# Patient Record
Sex: MALE | Race: WHITE | NOT HISPANIC OR LATINO | Employment: FULL TIME | URBAN - METROPOLITAN AREA
[De-identification: names, ages, dates, MRNs, and addresses within clinical notes are randomized per-mention and may not be internally consistent; named-entity substitution may affect disease eponyms.]

---

## 2019-01-10 ENCOUNTER — OFFICE VISIT - HEALTHEAST (OUTPATIENT)
Dept: FAMILY MEDICINE | Facility: CLINIC | Age: 34
End: 2019-01-10

## 2019-01-10 DIAGNOSIS — R41.840 CONCENTRATION DEFICIT: ICD-10-CM

## 2019-01-10 DIAGNOSIS — F41.1 GAD (GENERALIZED ANXIETY DISORDER): ICD-10-CM

## 2019-01-21 ENCOUNTER — RECORDS - HEALTHEAST (OUTPATIENT)
Dept: ADMINISTRATIVE | Facility: OTHER | Age: 34
End: 2019-01-21

## 2019-03-08 ENCOUNTER — COMMUNICATION - HEALTHEAST (OUTPATIENT)
Dept: ADMINISTRATIVE | Facility: CLINIC | Age: 34
End: 2019-03-08

## 2019-12-23 ENCOUNTER — COMMUNICATION - HEALTHEAST (OUTPATIENT)
Dept: FAMILY MEDICINE | Facility: CLINIC | Age: 34
End: 2019-12-23

## 2019-12-24 ENCOUNTER — OFFICE VISIT - HEALTHEAST (OUTPATIENT)
Dept: FAMILY MEDICINE | Facility: CLINIC | Age: 34
End: 2019-12-24

## 2019-12-24 DIAGNOSIS — S82.56XA: ICD-10-CM

## 2019-12-24 DIAGNOSIS — F41.1 GAD (GENERALIZED ANXIETY DISORDER): ICD-10-CM

## 2019-12-24 DIAGNOSIS — Z01.818 PREOP EXAMINATION: ICD-10-CM

## 2019-12-24 LAB
ANION GAP SERPL CALCULATED.3IONS-SCNC: 11 MMOL/L (ref 5–18)
BUN SERPL-MCNC: 16 MG/DL (ref 8–22)
CALCIUM SERPL-MCNC: 9.6 MG/DL (ref 8.5–10.5)
CHLORIDE BLD-SCNC: 105 MMOL/L (ref 98–107)
CO2 SERPL-SCNC: 27 MMOL/L (ref 22–31)
CREAT SERPL-MCNC: 1.13 MG/DL (ref 0.7–1.3)
GFR SERPL CREATININE-BSD FRML MDRD: >60 ML/MIN/1.73M2
GLUCOSE BLD-MCNC: 136 MG/DL (ref 70–125)
HGB BLD-MCNC: 13.7 G/DL (ref 14–18)
POTASSIUM BLD-SCNC: 4.1 MMOL/L (ref 3.5–5)
SODIUM SERPL-SCNC: 143 MMOL/L (ref 136–145)

## 2019-12-24 ASSESSMENT — ANXIETY QUESTIONNAIRES
7. FEELING AFRAID AS IF SOMETHING AWFUL MIGHT HAPPEN: NOT AT ALL
5. BEING SO RESTLESS THAT IT IS HARD TO SIT STILL: MORE THAN HALF THE DAYS
1. FEELING NERVOUS, ANXIOUS, OR ON EDGE: MORE THAN HALF THE DAYS
6. BECOMING EASILY ANNOYED OR IRRITABLE: MORE THAN HALF THE DAYS
4. TROUBLE RELAXING: MORE THAN HALF THE DAYS
2. NOT BEING ABLE TO STOP OR CONTROL WORRYING: SEVERAL DAYS
3. WORRYING TOO MUCH ABOUT DIFFERENT THINGS: MORE THAN HALF THE DAYS
GAD7 TOTAL SCORE: 11

## 2019-12-24 ASSESSMENT — PATIENT HEALTH QUESTIONNAIRE - PHQ9: SUM OF ALL RESPONSES TO PHQ QUESTIONS 1-9: 7

## 2019-12-24 ASSESSMENT — MIFFLIN-ST. JEOR: SCORE: 1752.7

## 2019-12-26 ENCOUNTER — RECORDS - HEALTHEAST (OUTPATIENT)
Dept: ADMINISTRATIVE | Facility: OTHER | Age: 34
End: 2019-12-26

## 2020-01-30 ENCOUNTER — COMMUNICATION - HEALTHEAST (OUTPATIENT)
Dept: FAMILY MEDICINE | Facility: CLINIC | Age: 35
End: 2020-01-30

## 2020-01-30 DIAGNOSIS — F41.1 GAD (GENERALIZED ANXIETY DISORDER): ICD-10-CM

## 2020-03-10 ENCOUNTER — OFFICE VISIT - HEALTHEAST (OUTPATIENT)
Dept: FAMILY MEDICINE | Facility: CLINIC | Age: 35
End: 2020-03-10

## 2020-03-10 ENCOUNTER — RECORDS - HEALTHEAST (OUTPATIENT)
Dept: GENERAL RADIOLOGY | Facility: CLINIC | Age: 35
End: 2020-03-10

## 2020-03-10 DIAGNOSIS — S79.911A UNSPECIFIED INJURY OF RIGHT HIP, INITIAL ENCOUNTER: ICD-10-CM

## 2020-03-10 DIAGNOSIS — S79.911A INJURY OF RIGHT HIP, INITIAL ENCOUNTER: ICD-10-CM

## 2020-03-10 DIAGNOSIS — R03.0 ELEVATED BLOOD PRESSURE READING WITHOUT DIAGNOSIS OF HYPERTENSION: ICD-10-CM

## 2020-03-10 DIAGNOSIS — T14.8XXA HEMATOMA OF SKIN: ICD-10-CM
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
## 2020-03-18 ENCOUNTER — COMMUNICATION - HEALTHEAST (OUTPATIENT)
Dept: FAMILY MEDICINE | Facility: CLINIC | Age: 35
End: 2020-03-18

## 2020-03-19 ENCOUNTER — OFFICE VISIT - HEALTHEAST (OUTPATIENT)
Dept: FAMILY MEDICINE | Facility: CLINIC | Age: 35
End: 2020-03-19

## 2020-03-19 DIAGNOSIS — R03.0 ELEVATED BLOOD PRESSURE READING WITHOUT DIAGNOSIS OF HYPERTENSION: ICD-10-CM

## 2020-03-19 DIAGNOSIS — T14.8XXA HEMATOMA OF SKIN: ICD-10-CM

## 2020-04-20 ENCOUNTER — OFFICE VISIT - HEALTHEAST (OUTPATIENT)
Dept: FAMILY MEDICINE | Facility: CLINIC | Age: 35
End: 2020-04-20

## 2020-04-20 DIAGNOSIS — T14.8XXA HEMATOMA OF SKIN: ICD-10-CM

## 2020-04-20 DIAGNOSIS — R03.0 ELEVATED BLOOD PRESSURE READING WITHOUT DIAGNOSIS OF HYPERTENSION: ICD-10-CM

## 2020-12-01 ENCOUNTER — OFFICE VISIT - HEALTHEAST (OUTPATIENT)
Dept: FAMILY MEDICINE | Facility: CLINIC | Age: 35
End: 2020-12-01

## 2020-12-01 DIAGNOSIS — M79.671 RIGHT FOOT PAIN: ICD-10-CM

## 2020-12-01 DIAGNOSIS — Z01.818 PREOP EXAMINATION: ICD-10-CM

## 2020-12-01 LAB
ANION GAP SERPL CALCULATED.3IONS-SCNC: 8 MMOL/L (ref 5–18)
BUN SERPL-MCNC: 19 MG/DL (ref 8–22)
CALCIUM SERPL-MCNC: 9.4 MG/DL (ref 8.5–10.5)
CHLORIDE BLD-SCNC: 105 MMOL/L (ref 98–107)
CO2 SERPL-SCNC: 29 MMOL/L (ref 22–31)
CREAT SERPL-MCNC: 1.06 MG/DL (ref 0.7–1.3)
GFR SERPL CREATININE-BSD FRML MDRD: >60 ML/MIN/1.73M2
GLUCOSE BLD-MCNC: 127 MG/DL (ref 70–125)
HGB BLD-MCNC: 14 G/DL (ref 14–18)
POTASSIUM BLD-SCNC: 3.8 MMOL/L (ref 3.5–5)
SODIUM SERPL-SCNC: 142 MMOL/L (ref 136–145)

## 2020-12-01 ASSESSMENT — MIFFLIN-ST. JEOR: SCORE: 1825.85

## 2020-12-07 ENCOUNTER — RECORDS - HEALTHEAST (OUTPATIENT)
Dept: ADMINISTRATIVE | Facility: OTHER | Age: 35
End: 2020-12-07

## 2021-01-17 ENCOUNTER — COMMUNICATION - HEALTHEAST (OUTPATIENT)
Dept: FAMILY MEDICINE | Facility: CLINIC | Age: 36
End: 2021-01-17

## 2021-01-17 DIAGNOSIS — F41.1 GAD (GENERALIZED ANXIETY DISORDER): ICD-10-CM

## 2021-01-18 RX ORDER — ESCITALOPRAM OXALATE 10 MG/1
TABLET ORAL
Qty: 90 TABLET | Refills: 3 | Status: SHIPPED | OUTPATIENT
Start: 2021-01-18 | End: 2022-03-31

## 2021-05-05 ENCOUNTER — RECORDS - HEALTHEAST (OUTPATIENT)
Dept: ADMINISTRATIVE | Facility: OTHER | Age: 36
End: 2021-05-05

## 2021-05-24 ENCOUNTER — RECORDS - HEALTHEAST (OUTPATIENT)
Dept: ADMINISTRATIVE | Facility: CLINIC | Age: 36
End: 2021-05-24

## 2021-05-26 ASSESSMENT — PATIENT HEALTH QUESTIONNAIRE - PHQ9: SUM OF ALL RESPONSES TO PHQ QUESTIONS 1-9: 7

## 2021-05-28 ASSESSMENT — ANXIETY QUESTIONNAIRES: GAD7 TOTAL SCORE: 11

## 2021-05-29 ENCOUNTER — RECORDS - HEALTHEAST (OUTPATIENT)
Dept: ADMINISTRATIVE | Facility: CLINIC | Age: 36
End: 2021-05-29

## 2021-05-30 ENCOUNTER — RECORDS - HEALTHEAST (OUTPATIENT)
Dept: ADMINISTRATIVE | Facility: CLINIC | Age: 36
End: 2021-05-30

## 2021-05-31 ENCOUNTER — RECORDS - HEALTHEAST (OUTPATIENT)
Dept: ADMINISTRATIVE | Facility: CLINIC | Age: 36
End: 2021-05-31

## 2021-06-02 VITALS — WEIGHT: 182.19 LBS | BODY MASS INDEX: 26.14 KG/M2

## 2021-06-04 VITALS
BODY MASS INDEX: 26.08 KG/M2 | TEMPERATURE: 98.3 F | SYSTOLIC BLOOD PRESSURE: 116 MMHG | OXYGEN SATURATION: 97 % | DIASTOLIC BLOOD PRESSURE: 84 MMHG | HEART RATE: 69 BPM | HEIGHT: 70 IN | WEIGHT: 182.2 LBS

## 2021-06-04 VITALS
SYSTOLIC BLOOD PRESSURE: 150 MMHG | DIASTOLIC BLOOD PRESSURE: 110 MMHG | BODY MASS INDEX: 28.12 KG/M2 | HEART RATE: 68 BPM | WEIGHT: 196 LBS

## 2021-06-04 NOTE — PROGRESS NOTES
Preoperative Exam    Scheduled Procedure: Rt ankle surgery  12/26/19 - [facility]: Fax:[phone] - Dr. Kp Parker  Surgery Date:  12/26/19  Surgery Location: [facility]: Fax:[phone]     Surgeon:  Dr. Kp Parker    Assessment/Plan:     1. Preop examination    The patient is approved for the indicated surgery, with the proper anesthesia.  Patient should have no problems with this and I would expect a normal recovery.  Follow-up in their primary care clinic with any primary care needs after the surgery.  The necessary labs and tests were done today and reviewed by me.     - Hemoglobin  - Basic Metabolic Panel  - Td, Preservative Free (green label)    2. Nondisplaced fracture of medial malleolus of unspecified tibia, initial encounter for closed fracture      3. BYRON (generalized anxiety disorder)          Surgical Procedure Risk: Low (reported cardiac risk generally < 1%)  Have you had prior anesthesia?: Yes  Have you or any family members had a previous anesthesia reaction:  No  Do you or any family members have a history of a clotting or bleeding disorder?: No  Cardiac Risk Assessment: no increased risk for major cardiac complications    APPROVAL GIVEN to proceed with proposed procedure, without further diagnostic evaluation        Functional Status: Independent  Patient plans to recover at home with family.     Subjective:      Rogerio Calvillo is a 34 y.o. male who presents for a preoperative consultation.  He suffered a fractured medial malleolus at work a few weeks ago.  He was seen by orthopedics and was put in a boot and because the fracture is deemed to be intra-articular, he was recommended to have an open reduction internal fixation which he will have done on 1226.  He does not have a whole lot of pain when he is in the boot, but he does have some pain in the morning when he wakes up and then toward the end of the day as  well.    He is otherwise very healthy and has no other major concerns today.  He has had surgery and anesthesia before and he does well, with the exception that is get a bit nauseated and sometimes throws up after he comes out of anesthesia.    All other systems reviewed and are negative, other than those listed in the HPI.    Pertinent History  Do you have difficulty breathing or chest pain after walking up a flight of stairs: No  History of obstructive sleep apnea: No  Steroid use in the last 6 months: No  Frequent Aspirin/NSAID use: No  Prior Blood Transfusion: No  Prior Blood Transfusion Reaction: No  If for some reason prior to, during or after the procedure, if it is medically indicated, would you be willing to have a blood transfusion?:  There is no transfusion refusal.    Current Outpatient Medications   Medication Sig Dispense Refill     crutch Misc For home use. 99 weeks       escitalopram oxalate (LEXAPRO) 10 MG tablet Take 1 tablet (10 mg total) by mouth daily. 90 tablet 3     oxyCODONE (ROXICODONE) 5 MG immediate release tablet Take 5 mg by mouth.       No current facility-administered medications for this visit.         No Known Allergies    Patient Active Problem List   Diagnosis     BYRON (generalized anxiety disorder)       Past Medical History:   Diagnosis Date     Anxiety        Past Surgical History:   Procedure Laterality Date     MIDDLE EAR SURGERY         Social History     Socioeconomic History     Marital status: Single     Spouse name: Not on file     Number of children: Not on file     Years of education: Not on file     Highest education level: Not on file   Occupational History     Not on file   Social Needs     Financial resource strain: Not on file     Food insecurity:     Worry: Not on file     Inability: Not on file     Transportation needs:     Medical: Not on file     Non-medical: Not on file   Tobacco Use     Smoking status: Never Smoker     Smokeless tobacco: Never Used   Substance  "and Sexual Activity     Alcohol use: Yes     Frequency: 2-4 times a month     Comment: 1-2/month     Drug use: No     Sexual activity: Not Currently   Lifestyle     Physical activity:     Days per week: Not on file     Minutes per session: Not on file     Stress: Not on file   Relationships     Social connections:     Talks on phone: Not on file     Gets together: Not on file     Attends Yazdanism service: Not on file     Active member of club or organization: Not on file     Attends meetings of clubs or organizations: Not on file     Relationship status: Not on file     Intimate partner violence:     Fear of current or ex partner: Not on file     Emotionally abused: Not on file     Physically abused: Not on file     Forced sexual activity: Not on file   Other Topics Concern     Not on file   Social History Narrative     Not on file       Patient Care Team:  Jatinder Godoy MD as PCP - General (Family Medicine)  Jatinder Godoy MD as Assigned PCP          Objective:     Vitals:    12/24/19 0952   BP: (!) 134/100   Pulse: 69   Temp: 98.3  F (36.8  C)   SpO2: 97%   Weight: 182 lb 3.2 oz (82.6 kg)   Height: 5' 10\" (1.778 m)         Physical Exam:    General: Awake, Alert and Cooperative   Head: Normocephalic and Atraumatic   Eyes: PERRL, EOMI.   ENT: Normal pearly TMs bilaterally and Oropharynx clear   Neck: Supple and Thyroid without enlargement or nodules   Chest: Chest wall normal   Lungs: Clear to auscultation bilaterally   Heart:: Regular rate and rhythm and no murmurs.  No significant LE edema.   Abdomen: Soft, nontender, nondistended and no hepatosplenomegaly   Musculoskeletal: Moving all extremities and No pain in the extremities   Neuro: Alert and oriented times 3 and Grossly normal   Skin: No rashes or lesions noted        There are no Patient Instructions on file for this visit.        Labs:  Recent Results (from the past 24 hour(s))   Hemoglobin    Collection Time: 12/24/19 10:01 AM   Result Value Ref Range "    Hemoglobin 13.7 (L) 14.0 - 18.0 g/dL       Immunization History   Administered Date(s) Administered     Td,adult,historic,unspecified 1983     Tdap 08/25/2008           Electronically signed by Jatinder Godoy MD 12/24/19 9:55 AM

## 2021-06-04 NOTE — TELEPHONE ENCOUNTER
New Appointment Needed  What is the reason for the visit:    Pre-Op Appt Request  When is the surgery? :  12.26.19  Where is the surgery?:   [facility] [place]  Who is the surgeon? :  Dr. Kamar Parker from [facility]   What type of surgery is being done?: Ankle Surgery   Provider Preference: Any available  How soon do you need to be seen?: tomorrow  Waitlist offered?: No  Okay to leave a detailed message:  Yes

## 2021-06-04 NOTE — TELEPHONE ENCOUNTER
Are you able to see patient today or tomorrow. Surgery scheduled on 12/26/19. Please advise. Thank you, Katty Tidwell

## 2021-06-04 NOTE — TELEPHONE ENCOUNTER
We could double book him in with the physicals somewhere tomorrow am.    Earlier the better.    TS

## 2021-06-05 VITALS
TEMPERATURE: 97.5 F | HEIGHT: 70 IN | DIASTOLIC BLOOD PRESSURE: 102 MMHG | HEART RATE: 66 BPM | BODY MASS INDEX: 28.52 KG/M2 | SYSTOLIC BLOOD PRESSURE: 132 MMHG | OXYGEN SATURATION: 98 % | WEIGHT: 199.2 LBS

## 2021-06-05 NOTE — TELEPHONE ENCOUNTER
LMTCB - Please let pt know we received a refill request and noticed he is due for F/U for med check and as he has not had a Physical yet with us, we could do see him for a Physical (Fasting) and also review post ankle surgery when able.

## 2021-06-05 NOTE — TELEPHONE ENCOUNTER
Last refilled: 1/10/19 for #90, 3 refills  Last OV: 12/24/19 for Pre-Op  Est Care: 1/10/19  2. BYRON (generalized anxiety disorder)            Plan: Need refills of escitalopram for him at 10 mg a day.  I gave him 90 days with 3 refills.  He should do well with this as he has been tolerating it very well and it has been working for him.     I did refer him to mental health for ADHD testing and we can see him back after the testing has been done and if it confirms our suspicion of ADHD we can certainly medicate him here and manage that from this office.     Subjective: 33-year-old male who is here today for establishing care as well as some anxiety issues.  He states that he has had anxiety for pretty much most of his adult life, but he was put on a medication about a year ago, being S citalopram and this is worked well for him at 10 mg a day.  It takes away some of the rage feelings that he gets occasionally and helps him to be less anxious.  He has no significant side effects or problems to the medication at all.     He also would like to be tested for ADHD.  He states that he has really had trouble with his focus and direction since he has been really in elementary school, but he has never been on any medication for it.  He has been told by multiple people at work and at home that he probably needs some assistance with that.

## 2021-06-06 NOTE — PROGRESS NOTES
ASSESSMENT/PLAN:    Injury of right hip, hematoma, fall  35-year-old gentleman who sustained a fall for which he slipped on ice and landed on his right hip.  He has been complaining of right hip pain and swelling.  X-ray was negative for fracture dislocation.  Examination revealed the hematoma of the left lateral hip.  Recommend ice, modified weightbearing activities, and time.  Over-the-counter analgesics for pain  -     XR Hip Right 2 or More VWS; Future    Elevated blood pressure reading without diagnosis of hypertension  Blood pressure elevated today.  I asked him to follow-up in 1 week for blood pressure recheck.  He verbalized understanding and agree with the plan      Orders Placed This Encounter   Procedures     XR Hip Right 2 or More VWS     Standing Status:   Future     Number of Occurrences:   1     Standing Expiration Date:   3/10/2021     Order Specific Question:   Can the procedure be changed per Radiologist protocol?     Answer:   Yes     CHIEF COMPLAINT:  Chief Complaint   Patient presents with     Hip Pain     fell x 1.5 weeks and hurt right hip, hip is bruised     HISTORY OF PRESENT ILLNESS:  Rogerio is a 35 y.o. male presenting to the clinic today for right hip pain. He was walking down his driveway when he slipped on ice and landed on his right hip 1.5 weeks ago. He has pain in his right hip, lower back and right leg. He did not hit his head. He had a bruise along his right thigh which has since healed. The pain is present while he is sitting. He does not have any pain with rising from a seated position. He will take ibuprofen to try to reduce the inflammation. He has also taken leftover oxycodone from his ankle surgery to improve his hip pain.     REVIEW OF SYSTEMS:   Constitutional: Negative.   HENT: Negative.   Eyes: Negative.   Respiratory: Negative.   Cardiovascular: Negative.   Gastrointestinal: Negative.   Endocrine: Negative.   Genitourinary: Negative.   Musculoskeletal: Negative.   Skin:  Negative.   Allergic/Immunologic: Negative.   Neurological: Negative.   Hematological: Negative.   Psychiatric/Behavioral: Negative.   All other systems are negative.    PFSH:  He works in construction. He has been out of work since December 14th because of an ankle injury at work. He is currently in physical therapy for his ankle. He is planning to return to work in the second week in April.     TOBACCO USE:  Social History     Tobacco Use   Smoking Status Never Smoker   Smokeless Tobacco Never Used       VITALS:  Vitals:    03/10/20 1315 03/10/20 1334   BP: (!) 142/100 (!) 150/110   Patient Site:  Left Arm   Patient Position:  Sitting   Cuff Size:  Adult Regular   Pulse: 68    Weight: 196 lb (88.9 kg)      Wt Readings from Last 3 Encounters:   03/10/20 196 lb (88.9 kg)   12/24/19 182 lb 3.2 oz (82.6 kg)   01/10/19 182 lb 3 oz (82.6 kg)       PHYSICAL EXAM:  Constitutional: Patient is oriented to person, place, and time. Patient appears well-developed and well-nourished. No distress.   Head: Normocephalic and atraumatic.   Right Ear: External ear normal.   Left Ear: External ear normal.   Nose: Nose normal.   Musculoskeletal: He is able to rise form sitting position without difficulty. He walks without any gait imbalance. There is no bruising of the back. There is no tenderness to palpation of the spinous or paraspinous process. There is a small bruise along the trochanter bursa on the right side. He has a hematoma on the lateral right hip which is tender to touch.   Neurological: Patient is alert and oriented to person, place, and time. Patient has normal reflexes. No cranial nerve deficit. Coordination normal.   Skin: Skin is warm and dry. No rash noted. Patient is not diaphoretic. No erythema. No pallor.    Xr Hip Right 2 Or More Vws    Result Date: 3/10/2020  EXAM: XR HIP RIGHT 2 OR MORE VWS LOCATION: [facility] DATE/TIME: 3/10/2020 1:52 PM INDICATION: Unspecified injury of right hip, initial  encounter COMPARISON: None.     Normal joint spaces and alignment. No fracture.     ADDITIONAL HISTORY SUMMARIZED (2): None.  DECISION TO OBTAIN EXTRA INFORMATION (1): None.   RADIOLOGY TESTS (1): Ordered XR Hip Right today.   LABS (1): None.  MEDICINE TESTS (1): None.  INDEPENDENT REVIEW (2 each): Independently read XR Hip from today.     The visit lasted a total of 11 minutes face to face with the patient. Over 50% of the time was spent counseling and educating the patient about right hip pain.    IRuthie, am scribing for and in the presence of, Dr. Schrader.    I, Dr. Schrader, personally performed the services described in this documentation, as scribed by Ruthie Patterson in my presence, and it is both accurate and complete.    MEDICATIONS:  Current Outpatient Medications   Medication Sig Dispense Refill     escitalopram oxalate (LEXAPRO) 10 MG tablet TAKE ONE TABLET BY MOUTH ONE TIME DAILY  90 tablet 2     crutch Misc For home use. 99 weeks       oxyCODONE (ROXICODONE) 5 MG immediate release tablet Take 5 mg by mouth.       No current facility-administered medications for this visit.        Total data points:3

## 2021-06-06 NOTE — TELEPHONE ENCOUNTER
Upcoming Appointment Question  When is the appointment: Tomorrow  What is your appointment for?: Follow up  Who is your appointment scheduled with?: PCP only  What is your question/concern?: Pt is ok with telephone visit, same date and time.  No note in epic.  Okay to leave a detailed message?: No

## 2021-06-07 NOTE — PROGRESS NOTES
"Rogerio Calvillo is a 35 y.o. male who is being evaluated via a billable telephone visit.      The patient has been notified of following:     \"This telephone visit will be conducted via a call between you and your physician/provider. We have found that certain health care needs can be provided without the need for a physical exam.  This service lets us provide the care you need with a short phone conversation.  If a prescription is necessary we can send it directly to your pharmacy.  If lab work is needed we can place an order for that and you can then stop by our lab to have the test done at a later time.    Telephone visits are billed at different rates depending on your insurance coverage. During this emergency period, for some insurers they may be billed the same as an in-person visit.  Please reach out to your insurance provider with any questions.    If during the course of the call the physician/provider feels a telephone visit is not appropriate, you will not be charged for this service.\"    Patient has given verbal consent to a Telephone visit? Yes    Patient would like to receive their AVS by AVS Preference: Mail a copy.    Additional provider notes: This patient's visit was changed from a regular office visit to a billable telephone visit, due to the recent coronavirus issue, and the patient was comfortable with that.     ASSESSMENT:  1. Hematoma of skin    He seems to be improving as I would expect him to at this point.  He will continue expectant management, and treating with Tylenol as needed.  I told him he can still use heat on it as needed.  He seems pretty reassured that it nearly resolved and he will let me know if he has any further problems.    2. Elevated blood pressure reading without diagnosis of hypertension    As he was unable to monitor his blood pressure while he was on vacation, he will start doing it now, and he will let us know how the blood pressures go over the next couple of " weeks.          PLAN:  There are no Patient Instructions on file for this visit.    No orders of the defined types were placed in this encounter.    Medications Discontinued During This Encounter   Medication Reason     oxyCODONE (ROXICODONE) 5 MG immediate release tablet Therapy completed     crutch Misc        No follow-ups on file.    CHIEF COMPLAINT:  Chief Complaint   Patient presents with     Follow-up     follow up on hematoma - getting better but still a bit of pain.        HISTORY OF PRESENT ILLNESS:  Rogerio is a 35 y.o. male presenting for a telephone visit today for a follow-up.  We talked to him a month ago when he had a hematoma on his hip from falling on the ice.  He reports that that is pretty much resolved.  He is still get occasional aching in the hip but it is getting more and more sporadic over time and seems to be resolving and he is quite comfortable with that.    Also we discussed that during that visit, we had discussed his blood pressure being a bit elevated and I suggested that he check it a few times a day and get back to me.  However then he went on vacation to [place] for several weeks and he was unable to check his blood pressures and so now he will redouble his efforts to start checking it again and see where he has had and let me know.    REVIEW OF SYSTEMS:     All other systems are negative.    PFSH:    Reviewed      TOBACCO USE:  Social History     Tobacco Use   Smoking Status Never Smoker   Smokeless Tobacco Never Used         MEDICATIONS:  Current Outpatient Medications   Medication Sig Dispense Refill     escitalopram oxalate (LEXAPRO) 10 MG tablet TAKE ONE TABLET BY MOUTH ONE TIME DAILY  90 tablet 2     No current facility-administered medications for this visit.               Phone call duration:  8 minutes    MICHAEL Chaudhari MD

## 2021-06-07 NOTE — PROGRESS NOTES
"Rogerio Calvillo is a 35 y.o. male who is being evaluated via a billable telephone visit.      The patient has been notified of following:     \"This telephone visit will be conducted via a call between you and your physician/provider. We have found that certain health care needs can be provided without the need for a physical exam.  This service lets us provide the care you need with a short phone conversation.  If a prescription is necessary we can send it directly to your pharmacy.  If lab work is needed we can place an order for that and you can then stop by our lab to have the test done at a later time.    If during the course of the call the physician/provider feels a telephone visit is not appropriate, you will not be charged for this service.\"         Rogerio Calvillo complains of    Chief Complaint   Patient presents with     Hypertension     HTN - Hematoma - saw Dr. Schrader last week       I have reviewed and updated the patient's Past Medical History, Social History, Family History and Medication List.    ALLERGIES  Patient has no known allergies.    Additional provider notes: This patient's visit was changed from a regular office visit to a billable telephone visit, due to the recent coronavirus issue, and the patient was comfortable with that.     The purpose of our call today was to discuss his recent injury last week, for which he saw my partner here.  She appropriately did an x-ray and a work-up indicated that he did not have any hip injury internally, but diagnosed him with a hematoma of his hip.  He was given some pain medication and is done quite well.  The bruise is completely resolved, but he still has a bit of that hematoma which is giving him some pain and feels like it is on a nerve because it is giving him some shooting pain down into the buttock area.  He states that overall it is continuing to get a bit better.    She also noticed that his blood pressure was elevated last week when he came " in to see her and recommended observation.  He has had some sporadic blood pressure elevations in the past, but when I saw him for a physical last year it was within the normal range.  He does not have a cuff at home and has not been taking his blood pressure at home.  He does have a family history this positive for hypertension and heart disease.    Assessment/Plan:  1. Elevated blood pressure reading without diagnosis of hypertension    I recommended that he get a blood pressure cuff to have at home and that he should take his readings 3 or 4 times a week and we will make another follow-up phone call in a month and go over the readings that he is getting and determine whether he would benefit from a medication or not.  We also discussed other ways to try to lower it including exercise and a healthy diet etc.    2. Hematoma of skin    I reassured her on this that this should continue to get better over the next couple of weeks and we can check in on this as well when he calls back in a month.        Phone call duration:  13 minutes    Twila Estrella CMA

## 2021-06-13 NOTE — PROGRESS NOTES
[address] [address]  Dept: [phone]  Dept Fax: [phone]  Primary Provider: Jatinder Godoy MD  Pre-op Performing Provider: JATINDER GODOY    PREOPERATIVE EVALUATION:  Today's date: 12/1/2020    Rogerio Calvillo is a 35 y.o. male who presents for a preoperative evaluation.    Surgical Information:  Surgery/Procedure: Rt Ankle surgery, 12/7/2020, [facility] (pt is usure of location), Dr. KATI Parker  Surgery Location: [facility] (pt is usure of location)  Surgeon: Dr. KATI Parker  Surgery Date: 12/7/20  Time of Surgery: Unknown  Where patient plans to recover: At home with family  Fax number for surgical facility: [phone]     Type of Anesthesia Anticipated: Local with MAC    Subjective     HPI related to upcoming procedure: Patient here today for preoperative consultation.  He had a foot fracture last year in December and had an open reduction internal fixation done and now is having foot pain again and is thought to be due to the hardware that he has still to be taking some screws out of his foot with hopes that that will improve his pain.    Preop Questions 12/1/2020   Have you ever had a heart attack or stroke? No   Have you ever had surgery on your heart or blood vessels, such as a stent placement, a coronary artery bypass, or surgery on an artery in your head, neck, heart, or legs? No   Do you have chest pain with activity? No   Do you have a history of  heart failure? No   Do you currently have a cold, bronchitis or symptoms of other infection? No   Do you have a cough, shortness of breath, or wheezing? No   Do you or anyone in your family have previous history of blood clots? Not a first degree relative   Do you or does anyone in your family have a serious bleeding problem such as prolonged bleeding following surgeries or cuts? No   Have you ever had problems with anemia or been told to take iron pills? No   Have you had any abnormal blood  loss such as black, tarry or bloody stools? No   Have you ever had a blood transfusion? No   Are you willing to have a blood transfusion if it is medically needed before, during, or after your surgery? Yes   Have you or any of your relatives ever had problems with anesthesia? No   Do you have sleep apnea, excessive snoring or daytime drowsiness? No   Do you have any artifical heart valves or other implanted medical devices like a pacemaker, defibrillator, or continuous glucose monitor? No   Do you have artificial joints? No   Are you allergic to latex? No         Health Care Directive:  Patient does not have a Health Care Directive or Living Will: Discussed advance care planning with patient; information given to patient to review.    Preoperative Review of :    reviewed - no record of controlled substances prescribed.    See problem list for active medical problems.  Problems all longstanding and stable, except as noted/documented.  See ROS for pertinent symptoms related to these conditions.      Review of Systems  CONSTITUTIONAL: NEGATIVE for fever, chills, change in weight  ENT/MOUTH: Negative for ear, mouth, and throat problems  RESP: NEGATIVE for significant cough or SOB  CV: NEGATIVE for chest pain, palpitations or peripheral edema    Patient Active Problem List    Diagnosis Date Noted     BYRON (generalized anxiety disorder) 01/10/2019     Past Medical History:   Diagnosis Date     Anxiety      Past Surgical History:   Procedure Laterality Date     MIDDLE EAR SURGERY       Current Outpatient Medications   Medication Sig Dispense Refill     escitalopram oxalate (LEXAPRO) 10 MG tablet TAKE ONE TABLET BY MOUTH ONE TIME DAILY  90 tablet 2     multivitamin therapeutic tablet Take 1 tablet by mouth daily.       No current facility-administered medications for this visit.        No Known Allergies    Social History     Tobacco Use     Smoking status: Never Smoker     Smokeless tobacco: Never Used   Substance Use  "Topics     Alcohol use: Yes     Frequency: 2-4 times a month     Comment: 1-2/month      Family History   Problem Relation Age of Onset     Diabetes Mother      Hypertension Mother      No Medical Problems Father      Cancer Maternal Grandmother      Early death Maternal Grandfather      Heart disease Maternal Grandfather      No Medical Problems Paternal Grandmother      Heart disease Paternal Grandfather      Social History     Substance and Sexual Activity   Drug Use No        Objective     BP (!) 132/102 (Patient Site: Left Arm, Patient Position: Sitting, Cuff Size: Adult Large)   Pulse 66   Temp 97.5  F (36.4  C)   Ht 5' 9.75\" (1.772 m)   Wt 199 lb 3.2 oz (90.4 kg)   SpO2 98%   BMI 28.79 kg/m    Physical Exam  GENERAL APPEARANCE: healthy, alert and no distress  HENT: ear canals and TM's normal and nose and mouth without ulcers or lesions  RESP: lungs clear to auscultation - no rales, rhonchi or wheezes  CV: regular rate and rhythm, normal S1 S2, no S3 or S4 and no murmur, click or rub  ABDOMEN: soft, nontender, no HSM or masses and bowel sounds normal  NEURO: Normal strength and tone, sensory exam grossly normal, mentation intact and speech normal    Recent Labs   Lab Test 12/24/19  1001   HGB 13.7*      K 4.1   CREATININE 1.13        PRE-OP Diagnostics:   Labs pending at this time. Results will be reviewed when available.  No EKG required for low risk surgery (cataract, skin procedure, breast biopsy, etc).    REVISED CARDIAC RISK INDEX (RCRI)   The patient has the following serious cardiovascular risks for perioperative complications:   - No serious cardiac risks = 0 points    RCRI INTERPRETATION: 0 points: Class I (very low risk - 0.4% complication rate)         Assessment & Plan      The proposed surgical procedure is considered LOW risk.    Preop examination    - Hemoglobin  - Basic Metabolic Panel    Right foot pain         Risks and Recommendations:  The patient has the following additional " risks and recommendations for perioperative complications:   - No identified additional risk factors other than previously addressed    Medication Instructions:  Patient is to take all scheduled medications on the day of surgery    RECOMMENDATION:  APPROVAL GIVEN to proceed with proposed procedure, without further diagnostic evaluation.    Signed Electronically by: Jatinder Godoy MD    Copy of this evaluation report is provided to requesting physician.    [facility] Preop Guidelines    Revised Cardiac Risk Index

## 2021-06-14 NOTE — TELEPHONE ENCOUNTER
Refill Approved    Rx renewed per Medication Renewal Policy. Medication was last renewed on 1/30/20.    Carmen Mitchell, [facility] Connection Triage/Med Refill 1/18/2021     Requested Prescriptions   Pending Prescriptions Disp Refills     escitalopram oxalate (LEXAPRO) 10 MG tablet [Pharmacy Med Name: Escitalopram Oxalate Oral Tablet 10 MG] 90 tablet 0     Sig: TAKE ONE TABLET BY MOUTH ONE TIME DAILY       SSRI Refill Protocol  Passed - 1/17/2021 11:05 AM        Passed - PCP or prescribing provider visit in last year     Last office visit with prescriber/PCP: 1/10/2019 Jatinder Godoy MD OR same dept: 3/10/2020 Shaw Claros MD OR same specialty: 3/10/2020 Shaw Claros MD  Last physical: 12/1/2020 Last MTM visit: Visit date not found   Next visit within 3 mo: Visit date not found  Next physical within 3 mo: Visit date not found  Prescriber OR PCP: Jatinder Godoy MD  Last diagnosis associated with med order: 1. BYRON (generalized anxiety disorder)  - escitalopram oxalate (LEXAPRO) 10 MG tablet [Pharmacy Med Name: Escitalopram Oxalate Oral Tablet 10 MG]; TAKE ONE TABLET BY MOUTH ONE TIME DAILY   Dispense: 90 tablet; Refill: 0    If protocol passes may refill for 12 months if within 3 months of last provider visit (or a total of 15 months).

## 2021-06-16 PROBLEM — F41.1 GAD (GENERALIZED ANXIETY DISORDER): Status: ACTIVE | Noted: 2019-01-10

## 2021-06-16 NOTE — TELEPHONE ENCOUNTER
Telephone Encounter by Sheryl Gooden LPN at 1/30/2020 11:48 AM     Author: Sheryl Gooden LPN Service: -- Author Type: Licensed Nurse    Filed: 1/30/2020 11:48 AM Encounter Date: 1/30/2020 Status: Signed    : Sheryl Gooden LPN (Licensed Nurse)       Patient Returning Call  Reason for call:  Message from clinic  Information relayed to patient:  Twila Estrella, MICHAEL           1/30/20 10:35 AM   Note      LMTCB - Please let pt know we received a refill request and noticed he is due for F/U for med check and as he has not had a Physical yet with us, we could do see him for a Physical (Fasting) and also review post ankle surgery when able.           Patient has additional questions:  No  If YES, what are your questions/concerns:  n/a  Okay to leave a detailed message?: No call back needed

## 2021-06-23 NOTE — PROGRESS NOTES
1. Concentration deficit  Ambulatory referral to Psychiatry   2. BYRON (generalized anxiety disorder)          Plan: Need refills of escitalopram for him at 10 mg a day.  I gave him 90 days with 3 refills.  He should do well with this as he has been tolerating it very well and it has been working for him.    I did refer him to mental health for ADHD testing and we can see him back after the testing has been done and if it confirms our suspicion of ADHD we can certainly medicate him here and manage that from this office.    Subjective: 33-year-old male who is here today for establishing care as well as some anxiety issues.  He states that he has had anxiety for pretty much most of his adult life, but he was put on a medication about a year ago, being S citalopram and this is worked well for him at 10 mg a day.  It takes away some of the rage feelings that he gets occasionally and helps him to be less anxious.  He has no significant side effects or problems to the medication at all.    He also would like to be tested for ADHD.  He states that he has really had trouble with his focus and direction since he has been really in elementary school, but he has never been on any medication for it.  He has been told by multiple people at work and at home that he probably needs some assistance with that.    Patient is establishing care with me today. Please see past medical history, surgical history, social history and family history, all of which were completed in their entirety today.      30 minutes spent together with the patient today, more than 50% spent in counseling, discussing the above topics.

## 2022-03-30 ENCOUNTER — NURSE TRIAGE (OUTPATIENT)
Dept: NURSING | Facility: CLINIC | Age: 37
End: 2022-03-30

## 2022-03-30 DIAGNOSIS — F41.1 GAD (GENERALIZED ANXIETY DISORDER): ICD-10-CM

## 2022-03-30 NOTE — TELEPHONE ENCOUNTER
Rogerio was notified by pharmacy that his prescription was denied as it was sent to two pharmacies.  Pharmacy of choice is "[facility]" in [place] in [facility].  Patient is needing escitalopram 10 mg and takes one tablet by mouth daily.  Patient is needing a new prescription please today.  Patient is going out of town tomorrow and is requesting medication be sent today.  Please phone patient back when this has been completed.    COVID 19 Nurse Triage Plan/Patient Instructions    Please be aware that novel coronavirus (COVID-19) may be circulating in the community. If you develop symptoms such as fever, cough, or SOB or if you have concerns about the presence of another infection including coronavirus (COVID-19), please contact your health care provider or visit [email or web address].     Disposition/Instructions    Home care recommended. Follow home care protocol based instructions.    Thank you for taking steps to prevent the spread of this virus.  o Limit your contact with others.  o Wear a simple mask to cover your cough.  o Wash your hands well and often.    Resources    [facility]: About COVID-19: [email or web address]    CDC: What to Do If You're Sick: www.cdc.gov/coronavirus/2019-ncov/about/steps-when-sick.html    CDC: Ending Home Isolation: www.cdc.gov/coronavirus/2019-ncov/hcp/disposition-in-home-patients.html     CDC: Caring for Someone: www.cdc.gov/coronavirus/2019-ncov/if-you-are-sick/care-for-someone.html     [facility]: Interim Guidance for Hospital Discharge to Home: www.health.[email or web address].[email or web address]/diseases/coronavirus/hcp/hospdischarge.pdf    [facility] clinical trials (COVID-19 research studies): [email or web address]-trials     Below are the COVID-19 hotlines at the [facility] of Health ([facility]). Interpreters are available.   o For health questions: Call 906-093-9808 or 1-476.299.6207 (7 a.m. to 7 p.m.)  o For questions about schools and childcare:  Call [phone] or [phone] (7 a.m. to 7 p.m.)

## 2022-03-31 RX ORDER — ESCITALOPRAM OXALATE 10 MG/1
TABLET ORAL
Qty: 90 TABLET | Refills: 3 | Status: SHIPPED | OUTPATIENT
Start: 2022-03-31 | End: 2023-12-04

## 2023-04-18 ENCOUNTER — NURSE TRIAGE (OUTPATIENT)
Dept: NURSING | Facility: CLINIC | Age: 38
End: 2023-04-18

## 2023-04-18 NOTE — TELEPHONE ENCOUNTER
"Caller is Loi PharmD \"Smiley\".  Pt is seeking refill on Lexapro.  However, pt was last seen by a [facility] provider 2.5 years ago.  Also, original prescriber is no longer with [facility].  Discussed pt needs to re-establish care if wishing to remain within [facility].  PharmD verbalizes understanding.  Intends to inform patient.    [facility] Nurse Advisor     Reason for Disposition    Pharmacy with prescription refill question and triager answers question    Protocols used: MEDICATION REFILL AND RENEWAL CALL-A-OH      "

## 2023-05-01 ENCOUNTER — NURSE TRIAGE (OUTPATIENT)
Dept: NURSING | Facility: CLINIC | Age: 38
End: 2023-05-01

## 2023-05-01 NOTE — TELEPHONE ENCOUNTER
Ct from [facility] is calling about a refill for his Lexapro 10 mg tablet.  Patient is requesting a refill.  FNA advised that patient needs to reestablish care and has not been seen since 12/01/2020 and pharmacy agrees.      Reason for Disposition    Pharmacy calling with prescription question and triager answers question    Additional Information    Negative: Intentional drug overdose and suicidal thoughts or ideas    Negative: MORE THAN A DOUBLE DOSE of a prescription or over-the-counter (OTC) drug    Negative: DOUBLE DOSE (an extra dose or lesser amount) of prescription drug and any symptoms (e.g., dizziness, nausea, pain, sleepiness)    Negative: DOUBLE DOSE (an extra dose or lesser amount) of over-the-counter (OTC) drug and any symptoms (e.g., dizziness, nausea, pain, sleepiness)    Negative: Took another person's prescription drug    Negative: DOUBLE DOSE (an extra dose or lesser amount) of prescription drug and NO symptoms  (Exception: A double dose of antibiotics.)    Negative: Diabetes drug error or overdose (e.g., took wrong type of insulin or took extra dose)    Negative: Caller has medication question about med NOT prescribed by PCP and triager unable to answer question (e.g., compatibility with other med, storage)    Negative: Prescription not at pharmacy and was prescribed by PCP recently  (Exception: triager has access to EMR and prescription is recorded there. Go to Home Care and confirm for pharmacy.)    Negative: Pharmacy calling with prescription question and triager unable to answer question    Negative: Caller has URGENT medicine question about med that PCP or specialist prescribed and triager unable to answer question    Negative: Caller has NON-URGENT medicine question about med that PCP or specialist prescribed and triager unable to answer question    Negative: Caller wants to use a complementary or alternative medicine    Negative: Medicine patch causing local rash or itching     Negative: Prescription request for new medicine (not a refill)    Negative: Prescription prescribed recently is not at pharmacy and triager has access to patient's EMR and prescription is recorded in the EMR    Negative: DOUBLE DOSE (an extra dose or lesser amount) of over-the-counter (OTC) drug and NO symptoms    Negative: DOUBLE DOSE (an extra dose or lesser amount) of antibiotic drug and NO symptoms    Negative: Caller has medicine question only, adult not sick, and triager answers question    Negative: Caller has medicine question, adult has minor symptoms, caller declines triage, and triager answers question    Negative: Caller requesting information about medicine during pregnancy; adult is not ill AND triager answers question    Negative: Caller requesting information about medicine use with breastfeeding; neither adult nor infant is ill, triager answers question    Protocols used: MEDICATION QUESTION CALL-A-OH

## 2023-12-04 ENCOUNTER — OFFICE VISIT (OUTPATIENT)
Dept: INTERNAL MEDICINE | Facility: CLINIC | Age: 38
End: 2023-12-04
Payer: COMMERCIAL

## 2023-12-04 VITALS
HEIGHT: 70 IN | DIASTOLIC BLOOD PRESSURE: 92 MMHG | WEIGHT: 201 LBS | SYSTOLIC BLOOD PRESSURE: 138 MMHG | OXYGEN SATURATION: 97 % | BODY MASS INDEX: 28.77 KG/M2 | HEART RATE: 69 BPM | TEMPERATURE: 97.9 F | RESPIRATION RATE: 16 BRPM

## 2023-12-04 DIAGNOSIS — R73.9 ELEVATED BLOOD SUGAR: Primary | ICD-10-CM

## 2023-12-04 DIAGNOSIS — Z13.29 SCREENING FOR ENDOCRINE, NUTRITIONAL, METABOLIC AND IMMUNITY DISORDER: ICD-10-CM

## 2023-12-04 DIAGNOSIS — F41.9 ANXIETY: ICD-10-CM

## 2023-12-04 DIAGNOSIS — Z13.228 SCREENING FOR ENDOCRINE, NUTRITIONAL, METABOLIC AND IMMUNITY DISORDER: ICD-10-CM

## 2023-12-04 DIAGNOSIS — R03.0 ELEVATED BLOOD PRESSURE READING WITHOUT DIAGNOSIS OF HYPERTENSION: ICD-10-CM

## 2023-12-04 DIAGNOSIS — Z13.21 SCREENING FOR ENDOCRINE, NUTRITIONAL, METABOLIC AND IMMUNITY DISORDER: ICD-10-CM

## 2023-12-04 DIAGNOSIS — Z13.0 SCREENING FOR ENDOCRINE, NUTRITIONAL, METABOLIC AND IMMUNITY DISORDER: ICD-10-CM

## 2023-12-04 DIAGNOSIS — F33.1 MODERATE EPISODE OF RECURRENT MAJOR DEPRESSIVE DISORDER (H): ICD-10-CM

## 2023-12-04 LAB
ALBUMIN SERPL BCG-MCNC: 4.7 G/DL (ref 3.5–5.2)
ALP SERPL-CCNC: 63 U/L (ref 40–150)
ALT SERPL W P-5'-P-CCNC: 33 U/L (ref 0–70)
ANION GAP SERPL CALCULATED.3IONS-SCNC: 11 MMOL/L (ref 7–15)
AST SERPL W P-5'-P-CCNC: 40 U/L (ref 0–45)
BASOPHILS # BLD AUTO: 0 10E3/UL (ref 0–0.2)
BASOPHILS NFR BLD AUTO: 0 %
BILIRUB SERPL-MCNC: 0.5 MG/DL
BUN SERPL-MCNC: 16.8 MG/DL (ref 6–20)
CALCIUM SERPL-MCNC: 10 MG/DL (ref 8.6–10)
CHLORIDE SERPL-SCNC: 104 MMOL/L (ref 98–107)
CHOLEST SERPL-MCNC: 139 MG/DL
CREAT SERPL-MCNC: 1.14 MG/DL (ref 0.67–1.17)
DEPRECATED HCO3 PLAS-SCNC: 29 MMOL/L (ref 22–29)
EGFRCR SERPLBLD CKD-EPI 2021: 83 ML/MIN/1.73M2
EOSINOPHIL # BLD AUTO: 0.2 10E3/UL (ref 0–0.7)
EOSINOPHIL NFR BLD AUTO: 3 %
ERYTHROCYTE [DISTWIDTH] IN BLOOD BY AUTOMATED COUNT: 12 % (ref 10–15)
GLUCOSE SERPL-MCNC: 106 MG/DL (ref 70–99)
HCT VFR BLD AUTO: 41.6 % (ref 40–53)
HDLC SERPL-MCNC: 67 MG/DL
HGB BLD-MCNC: 14.6 G/DL (ref 13.3–17.7)
IMM GRANULOCYTES # BLD: 0 10E3/UL
IMM GRANULOCYTES NFR BLD: 0 %
LDLC SERPL CALC-MCNC: 43 MG/DL
LYMPHOCYTES # BLD AUTO: 1.7 10E3/UL (ref 0.8–5.3)
LYMPHOCYTES NFR BLD AUTO: 30 %
MCH RBC QN AUTO: 29.2 PG (ref 26.5–33)
MCHC RBC AUTO-ENTMCNC: 35.1 G/DL (ref 31.5–36.5)
MCV RBC AUTO: 83 FL (ref 78–100)
MONOCYTES # BLD AUTO: 0.6 10E3/UL (ref 0–1.3)
MONOCYTES NFR BLD AUTO: 11 %
NEUTROPHILS # BLD AUTO: 3.2 10E3/UL (ref 1.6–8.3)
NEUTROPHILS NFR BLD AUTO: 56 %
NONHDLC SERPL-MCNC: 72 MG/DL
PLATELET # BLD AUTO: 245 10E3/UL (ref 150–450)
POTASSIUM SERPL-SCNC: 4.4 MMOL/L (ref 3.4–5.3)
PROT SERPL-MCNC: 7.7 G/DL (ref 6.4–8.3)
RBC # BLD AUTO: 5 10E6/UL (ref 4.4–5.9)
SODIUM SERPL-SCNC: 144 MMOL/L (ref 135–145)
TRIGL SERPL-MCNC: 145 MG/DL
TSH SERPL DL<=0.005 MIU/L-ACNC: 0.57 UIU/ML (ref 0.3–4.2)
WBC # BLD AUTO: 5.6 10E3/UL (ref 4–11)

## 2023-12-04 PROCEDURE — 99203 OFFICE O/P NEW LOW 30 MIN: CPT | Performed by: NURSE PRACTITIONER

## 2023-12-04 PROCEDURE — 80061 LIPID PANEL: CPT | Performed by: NURSE PRACTITIONER

## 2023-12-04 PROCEDURE — 80053 COMPREHEN METABOLIC PANEL: CPT | Performed by: NURSE PRACTITIONER

## 2023-12-04 PROCEDURE — 83036 HEMOGLOBIN GLYCOSYLATED A1C: CPT | Performed by: NURSE PRACTITIONER

## 2023-12-04 PROCEDURE — 84443 ASSAY THYROID STIM HORMONE: CPT | Performed by: NURSE PRACTITIONER

## 2023-12-04 PROCEDURE — 85025 COMPLETE CBC W/AUTO DIFF WBC: CPT | Performed by: NURSE PRACTITIONER

## 2023-12-04 PROCEDURE — 36415 COLL VENOUS BLD VENIPUNCTURE: CPT | Performed by: NURSE PRACTITIONER

## 2023-12-04 RX ORDER — BUPROPION HYDROCHLORIDE 150 MG/1
150 TABLET ORAL EVERY MORNING
Qty: 90 TABLET | Refills: 3 | Status: SHIPPED | OUTPATIENT
Start: 2023-12-04 | End: 2024-01-29

## 2023-12-04 ASSESSMENT — ANXIETY QUESTIONNAIRES
3. WORRYING TOO MUCH ABOUT DIFFERENT THINGS: MORE THAN HALF THE DAYS
5. BEING SO RESTLESS THAT IT IS HARD TO SIT STILL: MORE THAN HALF THE DAYS
7. FEELING AFRAID AS IF SOMETHING AWFUL MIGHT HAPPEN: MORE THAN HALF THE DAYS
GAD7 TOTAL SCORE: 14
1. FEELING NERVOUS, ANXIOUS, OR ON EDGE: MORE THAN HALF THE DAYS
2. NOT BEING ABLE TO STOP OR CONTROL WORRYING: MORE THAN HALF THE DAYS
4. TROUBLE RELAXING: SEVERAL DAYS
GAD7 TOTAL SCORE: 14
IF YOU CHECKED OFF ANY PROBLEMS ON THIS QUESTIONNAIRE, HOW DIFFICULT HAVE THESE PROBLEMS MADE IT FOR YOU TO DO YOUR WORK, TAKE CARE OF THINGS AT HOME, OR GET ALONG WITH OTHER PEOPLE: SOMEWHAT DIFFICULT
6. BECOMING EASILY ANNOYED OR IRRITABLE: NEARLY EVERY DAY

## 2023-12-04 ASSESSMENT — PATIENT HEALTH QUESTIONNAIRE - PHQ9
SUM OF ALL RESPONSES TO PHQ QUESTIONS 1-9: 12
SUM OF ALL RESPONSES TO PHQ QUESTIONS 1-9: 12
10. IF YOU CHECKED OFF ANY PROBLEMS, HOW DIFFICULT HAVE THESE PROBLEMS MADE IT FOR YOU TO DO YOUR WORK, TAKE CARE OF THINGS AT HOME, OR GET ALONG WITH OTHER PEOPLE: NOT DIFFICULT AT ALL

## 2023-12-04 NOTE — PATIENT INSTRUCTIONS
Get a blood pressure cuff anywhere over-the-counter, Om[employer] brand is best.    Start checking your blood pressures daily alternating times of the day.  Record readings.  Goals for blood pressure less than 140/90, greater than 100/60.    Bring blood pressures with to follow-up.    Start the Wellbutrin 150 mg daily.  If having any weird side effects from the medication please stop it and update us.    Your labs are processing, we will call you with results once they are back.    Follow-up with Ray to establish care in 8 weeks.

## 2023-12-04 NOTE — PROGRESS NOTES
"  Assessment & Plan     Anxiety: BYRON-7 score today was a 14. Will start on Wellbutrin. Follow up in 8 weeks.   - buPROPion (WELLBUTRIN XL) 150 MG 24 hr tablet  Dispense: 90 tablet; Refill: 3    Moderate episode of recurrent major depressive disorder (H): PHQ-9 score today was a 12. Will check a TSH. Will start on wellbutrin, follow up in 8 weeks.   - buPROPion (WELLBUTRIN XL) 150 MG 24 hr tablet  Dispense: 90 tablet; Refill: 3  - TSH with free T4 reflex    Elevated blood pressure reading without diagnosis of hypertension: Blood pressure today was 138/92. Discussed diet, exercise, and getting a blood pressure cuff to monitor at home. Goals are less than 140/90; greater than 100/60. Bring pressures with to follow up in 8 weeks.     Screening for endocrine, nutritional, metabolic and immunity disorder: He was fasted today, will check labs.   - CBC with platelets and differential  - Comprehensive metabolic panel (BMP + Alb, Alk Phos, ALT, AST, Total. Bili, TP)  - Lipid panel reflex to direct LDL Fasting    BMI:   Estimated body mass index is 29.05 kg/m  as calculated from the following:    Height as of this encounter: 1.772 m (5' 9.75\").    Weight as of this encounter: 91.2 kg (201 lb).   Weight management plan: Discussed healthy diet and exercise guidelines    Depression Screening Follow Up        12/4/2023     1:48 PM   PHQ   PHQ-9 Total Score 12   Q9: Thoughts of better off dead/self-harm past 2 weeks Not at all         12/4/2023     1:48 PM   Last PHQ-9   1.  Little interest or pleasure in doing things 2   2.  Feeling down, depressed, or hopeless 2   3.  Trouble falling or staying asleep, or sleeping too much 1   4.  Feeling tired or having little energy 2   5.  Poor appetite or overeating 1   6.  Feeling bad about yourself 1   7.  Trouble concentrating 1   8.  Moving slowly or restless 2   Q9: Thoughts of better off dead/self-harm past 2 weeks 0   PHQ-9 Total Score 12       Follow Up Actions Taken  Crisis resource " information provided in After Visit Summary  Patient declined referral.       Jeniffer Hernández CNP  [facility]    Alonso Beatty is a 38 year old, presenting for the following health issues:  Hypertension (Check up for blood pressure and anxiety/depression)        12/4/2023     1:49 PM   Additional Questions   Roomed by Jannette KENDRICK       History of Present Illness       Mental Health Follow-up:  Patient presents to follow-up on Depression & Anxiety.Patient's depression since last visit has been:  Worse  The patient is not having other symptoms associated with depression.  Patient's anxiety since last visit has been:  Worse  The patient is not having other symptoms associated with anxiety.  Any significant life events: relationship concerns, job concerns and financial concerns  Patient is feeling anxious or having panic attacks.  Patient has no concerns about alcohol or drug use.    Hypertension: He presents for follow up of hypertension.  He does check blood pressure  regularly outside of the clinic. Outside blood pressures have been over 140/90. He follows a low salt diet.     He eats 2-3 servings of fruits and vegetables daily.He consumes 0 sweetened beverage(s) daily.He exercises with enough effort to increase his heart rate 9 or less minutes per day.  He exercises with enough effort to increase his heart rate 3 or less days per week.   He is taking medications regularly.     The patient presents today with concerns of a elevated blood pressure, anxiety, and depression.    He reports that he previously was taking Lexapro, but stopped this medication as he lost his health insurance. He reports lately noticing that he is having mood swings, is not himself, and has been through a lot lately in the past 3-4 months.    He did have erectile dysfunction with the Lexapro. Will try wellbutrin.    He is not checking his blood pressure at home, will have him start doing this.    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 He also reports overall eating healthy.     He reports that he is a social drinker, does not smoke, or take drugs.     Review of Systems   Constitutional, HEENT, cardiovascular, pulmonary, GI, , musculoskeletal, neuro, skin, endocrine and psych systems are negative, except as otherwise noted.      Objective    BP (!) 138/92   Pulse 69   Temp 97.9  F (36.6  C)   Resp 16   Ht 1.772 m (5' 9.75\")   Wt 91.2 kg (201 lb)   SpO2 97%   BMI 29.05 kg/m    Body mass index is 29.05 kg/m .  Physical Exam   GENERAL: healthy, alert and no distress  EYES: Eyes grossly normal to inspection  RESP: lungs clear to auscultation - no rales, rhonchi or wheezes  CV: regular rate and rhythm, normal S1 S2, no S3 or S4, no murmur, click or rub, no peripheral edema and peripheral pulses strong  MS: no gross musculoskeletal defects noted, no edema  SKIN: no suspicious lesions or rashes  NEURO: Normal strength and tone, mentation intact and speech normal  PSYCH: mentation appears normal, affect normal/bright          Answers submitted by the patient for this visit:  Patient Health Questionnaire (Submitted on 12/4/2023)  If you checked off any problems, how difficult have these problems made it for you to do your work, take care of things at home, or get along with other people?: Not difficult at all  PHQ9 TOTAL SCORE: 12    "

## 2023-12-06 LAB — HBA1C MFR BLD: 5.6 % (ref 0–5.6)

## 2023-12-13 ENCOUNTER — OFFICE VISIT (OUTPATIENT)
Dept: FAMILY MEDICINE | Facility: CLINIC | Age: 38
End: 2023-12-13
Payer: COMMERCIAL

## 2023-12-13 VITALS
RESPIRATION RATE: 14 BRPM | SYSTOLIC BLOOD PRESSURE: 150 MMHG | TEMPERATURE: 97.9 F | BODY MASS INDEX: 28.9 KG/M2 | DIASTOLIC BLOOD PRESSURE: 94 MMHG | HEART RATE: 61 BPM | OXYGEN SATURATION: 98 % | WEIGHT: 200 LBS

## 2023-12-13 DIAGNOSIS — R10.9 FLANK PAIN: Primary | ICD-10-CM

## 2023-12-13 LAB
ALBUMIN UR-MCNC: NEGATIVE MG/DL
APPEARANCE UR: CLEAR
BILIRUB UR QL STRIP: NEGATIVE
COLOR UR AUTO: YELLOW
GLUCOSE UR STRIP-MCNC: NEGATIVE MG/DL
HGB UR QL STRIP: NEGATIVE
KETONES UR STRIP-MCNC: NEGATIVE MG/DL
LEUKOCYTE ESTERASE UR QL STRIP: NEGATIVE
NITRATE UR QL: NEGATIVE
PH UR STRIP: 6 [PH] (ref 5–8)
SP GR UR STRIP: >=1.03 (ref 1–1.03)
UROBILINOGEN UR STRIP-ACNC: 0.2 E.U./DL

## 2023-12-13 PROCEDURE — 99203 OFFICE O/P NEW LOW 30 MIN: CPT

## 2023-12-13 PROCEDURE — 81003 URINALYSIS AUTO W/O SCOPE: CPT

## 2023-12-13 RX ORDER — VALACYCLOVIR HYDROCHLORIDE 1 G/1
1000 TABLET, FILM COATED ORAL 3 TIMES DAILY
Qty: 21 TABLET | Refills: 0 | Status: SHIPPED | OUTPATIENT
Start: 2023-12-13 | End: 2023-12-20

## 2023-12-14 NOTE — PROGRESS NOTES
Assessment & Plan       ICD-10-CM    1. Flank pain  R10.9 UA Macroscopic with reflex to Microscopic and Culture - Clinic Collect     valACYclovir (VALTREX) 1000 mg tablet         Pt has had shingles before and this feels similar. It's in about the same location as well. On exam there is no rash right now. There is R sided CVA tenderness, no urine sx. So I will get a UA and if positive for blood will get a BMP to check kidney function and patient should have imaging to check for a stone. He's not having any fevers to suggest pyelo. He's not having enough pain that I would think it was an obstruction. I will offer shingles prophylaxis if patient would like and if he feels really strongly that this is a recurrence of the shingles. After discussion patient did elect to try the antiviral medication prophylactically. Confirmed pharmacy and sent.       Follow up with primary care provider with any problems, questions or concerns or if symptoms worsen or fail to improve. Patient agreed to plan and verbalized understanding.     Alonso Beatty is a 38 year old male who presents to clinic today for the following health issues:  Chief Complaint   Patient presents with    Back Pain     Back pain radiating into rib. Patient also has a rash on face and on back. Patient states he has had shingles in past and feels like it.      HPI  A couple days of low back pain despite having no strenuous activity. Red bump on face that's new. Felt ill a couple days ago and yesterdays but no fevers. No urinary symptoms. Had shingles many years ago - 15 years ago. Feels like this is very similar - it's also in the same location. Feels like it's also possible that he just rubbed his back on the seat in his vehicle or something, but wanted to be sure it's not shingles again.       Review of Systems  10 point ROS otherwise negative.     Problem List:  2019-01: BYRON (generalized anxiety disorder)      Past Medical History:   Diagnosis Date     Anxiety        Social History     Tobacco Use    Smoking status: Never     Passive exposure: Past    Smokeless tobacco: Never   Substance Use Topics    Alcohol use: Yes     Comment: Alcoholic Drinks/day: 1-2/month           Objective    BP (!) 150/94   Pulse 61   Temp 97.9  F (36.6  C)   Resp 14   Wt 90.7 kg (200 lb)   SpO2 98%   BMI 28.90 kg/m    Physical Exam  Constitutional:       General: He is not in acute distress.     Appearance: Normal appearance.   HENT:      Head: Normocephalic and atraumatic.      Right Ear: External ear normal.      Left Ear: External ear normal.      Nose: Nose normal.      Mouth/Throat:      Mouth: Mucous membranes are moist.   Eyes:      General: No scleral icterus.     Extraocular Movements: Extraocular movements intact.      Conjunctiva/sclera: Conjunctivae normal.      Pupils: Pupils are equal, round, and reactive to light.   Pulmonary:      Effort: Pulmonary effort is normal. No respiratory distress.   Abdominal:      General: Abdomen is flat.   Musculoskeletal:         General: Normal range of motion.      Cervical back: Normal range of motion and neck supple.   Skin:     General: Skin is warm and dry.      Findings: No lesion or rash.      Comments: R CVA tenderness   Neurological:      General: No focal deficit present.      Mental Status: He is alert. Mental status is at baseline.   Psychiatric:         Mood and Affect: Mood normal.         Behavior: Behavior normal.              Nila Cooper MD

## 2023-12-27 ENCOUNTER — APPOINTMENT (OUTPATIENT)
Dept: RADIOLOGY | Facility: CLINIC | Age: 38
End: 2023-12-27
Payer: COMMERCIAL

## 2023-12-27 ENCOUNTER — HOSPITAL ENCOUNTER (EMERGENCY)
Facility: CLINIC | Age: 38
Discharge: HOME | End: 2023-12-27
Payer: COMMERCIAL

## 2023-12-27 VITALS
DIASTOLIC BLOOD PRESSURE: 96 MMHG | HEIGHT: 70 IN | OXYGEN SATURATION: 97 % | WEIGHT: 195 LBS | RESPIRATION RATE: 18 BRPM | SYSTOLIC BLOOD PRESSURE: 142 MMHG | HEART RATE: 71 BPM | BODY MASS INDEX: 27.92 KG/M2 | TEMPERATURE: 99.3 F

## 2023-12-27 DIAGNOSIS — J02.0 STREP PHARYNGITIS: ICD-10-CM

## 2023-12-27 DIAGNOSIS — J10.1 INFLUENZA A: ICD-10-CM

## 2023-12-27 LAB
FLUAV RNA SPEC QL NAA+PROBE: POSITIVE
FLUBV RNA RESP QL NAA+PROBE: NEGATIVE
GROUP A STREP BY PCR: DETECTED
RSV RNA SPEC NAA+PROBE: NEGATIVE
SARS-COV-2 RNA RESP QL NAA+PROBE: NEGATIVE

## 2023-12-27 PROCEDURE — 87651 STREP A DNA AMP PROBE: CPT | Performed by: EMERGENCY MEDICINE

## 2023-12-27 PROCEDURE — 250N000013 HC RX MED GY IP 250 OP 250 PS 637

## 2023-12-27 PROCEDURE — 71046 X-RAY EXAM CHEST 2 VIEWS: CPT

## 2023-12-27 PROCEDURE — 87637 SARSCOV2&INF A&B&RSV AMP PRB: CPT | Performed by: EMERGENCY MEDICINE

## 2023-12-27 PROCEDURE — 99284 EMERGENCY DEPT VISIT MOD MDM: CPT | Mod: 25

## 2023-12-27 RX ORDER — AMOXICILLIN 500 MG/1
500 CAPSULE ORAL 3 TIMES DAILY
Qty: 30 CAPSULE | Refills: 0 | Status: SHIPPED | OUTPATIENT
Start: 2023-12-27 | End: 2024-01-06

## 2023-12-27 RX ORDER — AMOXICILLIN 500 MG/1
500 CAPSULE ORAL EVERY 8 HOURS SCHEDULED
Status: DISCONTINUED | OUTPATIENT
Start: 2023-12-27 | End: 2023-12-27 | Stop reason: HOSPADM

## 2023-12-27 RX ORDER — BENZONATATE 100 MG/1
100 CAPSULE ORAL 3 TIMES DAILY PRN
Status: DISCONTINUED | OUTPATIENT
Start: 2023-12-27 | End: 2023-12-27 | Stop reason: HOSPADM

## 2023-12-27 RX ORDER — BENZONATATE 100 MG/1
100 CAPSULE ORAL 3 TIMES DAILY PRN
Qty: 20 CAPSULE | Refills: 0 | Status: SHIPPED | OUTPATIENT
Start: 2023-12-27 | End: 2024-01-29

## 2023-12-27 RX ORDER — ACETAMINOPHEN 500 MG
500 TABLET ORAL EVERY 6 HOURS PRN
Qty: 28 TABLET | Refills: 0 | Status: SHIPPED | OUTPATIENT
Start: 2023-12-27 | End: 2024-01-03

## 2023-12-27 RX ORDER — ACETAMINOPHEN 325 MG/1
650 TABLET ORAL ONCE
Status: COMPLETED | OUTPATIENT
Start: 2023-12-27 | End: 2023-12-27

## 2023-12-27 RX ADMIN — ACETAMINOPHEN 650 MG: 325 TABLET ORAL at 14:24

## 2023-12-27 RX ADMIN — BENZONATATE 100 MG: 100 CAPSULE ORAL at 14:24

## 2023-12-27 RX ADMIN — AMOXICILLIN 500 MG: 500 CAPSULE ORAL at 14:24

## 2023-12-27 ASSESSMENT — ENCOUNTER SYMPTOMS
SHORTNESS OF BREATH: 0
SORE THROAT: 1
FEVER: 0
DIZZINESS: 0
CHILLS: 0
PALPITATIONS: 0
LIGHT-HEADEDNESS: 0
HEADACHES: 0
COUGH: 1

## 2023-12-27 NOTE — ED TRIAGE NOTES
Pt reports having a congested cough for the last 3 days. Mild sore throat and nasal congestion. Denies taking any medications PTA.

## 2023-12-27 NOTE — DISCHARGE INSTRUCTIONS
You are influenza positive and strep positive.  COVID and RSV was negative.  Chest x-ray was negative.  Today in the emergency room you received a dose of Tylenol, amoxicillin and Tessalon Perles.  I have prescribed you amoxicillin to treat strep.  Please take your antibiotics as prescribed.  Take all of your antibiotics.  I have also prescribed you Tessalon Perles to help reduce your cough.  Please take those as prescribed as well.  I have also prescribed you Tylenol for your body aches.  Tylenol can also reduce your fever.  Return to the ED if new symptoms develop or symptoms worsen.  Follow-up with your primary care doctor to discuss your ED visit.

## 2023-12-27 NOTE — Clinical Note
Rogerio Calvillo was seen and treated in our emergency department on 12/27/2023.  He may return to work on 01/03/2024.       If you have any questions or concerns, please don't hesitate to call.      Christin Santiago, MAYUR

## 2024-01-29 ENCOUNTER — OFFICE VISIT (OUTPATIENT)
Dept: INTERNAL MEDICINE | Facility: CLINIC | Age: 39
End: 2024-01-29
Payer: COMMERCIAL

## 2024-01-29 VITALS
OXYGEN SATURATION: 96 % | HEIGHT: 70 IN | BODY MASS INDEX: 27.92 KG/M2 | TEMPERATURE: 97.9 F | RESPIRATION RATE: 16 BRPM | WEIGHT: 195 LBS | DIASTOLIC BLOOD PRESSURE: 82 MMHG | HEART RATE: 62 BPM | SYSTOLIC BLOOD PRESSURE: 130 MMHG

## 2024-01-29 DIAGNOSIS — F41.9 ANXIETY AND DEPRESSION: Primary | ICD-10-CM

## 2024-01-29 DIAGNOSIS — F32.A ANXIETY AND DEPRESSION: Primary | ICD-10-CM

## 2024-01-29 DIAGNOSIS — R03.0 ELEVATED BLOOD PRESSURE READING WITHOUT DIAGNOSIS OF HYPERTENSION: ICD-10-CM

## 2024-01-29 PROCEDURE — 99214 OFFICE O/P EST MOD 30 MIN: CPT | Performed by: NURSE PRACTITIONER

## 2024-01-29 RX ORDER — BUSPIRONE HYDROCHLORIDE 10 MG/1
10 TABLET ORAL 2 TIMES DAILY
Qty: 180 TABLET | Refills: 1 | Status: SHIPPED | OUTPATIENT
Start: 2024-01-29

## 2024-01-29 RX ORDER — DULOXETIN HYDROCHLORIDE 30 MG/1
30 CAPSULE, DELAYED RELEASE ORAL DAILY
Qty: 90 CAPSULE | Refills: 0 | Status: SHIPPED | OUTPATIENT
Start: 2024-01-29 | End: 2024-04-29

## 2024-01-29 ASSESSMENT — ANXIETY QUESTIONNAIRES
6. BECOMING EASILY ANNOYED OR IRRITABLE: NEARLY EVERY DAY
GAD7 TOTAL SCORE: 11
GAD7 TOTAL SCORE: 11
1. FEELING NERVOUS, ANXIOUS, OR ON EDGE: MORE THAN HALF THE DAYS
2. NOT BEING ABLE TO STOP OR CONTROL WORRYING: MORE THAN HALF THE DAYS
IF YOU CHECKED OFF ANY PROBLEMS ON THIS QUESTIONNAIRE, HOW DIFFICULT HAVE THESE PROBLEMS MADE IT FOR YOU TO DO YOUR WORK, TAKE CARE OF THINGS AT HOME, OR GET ALONG WITH OTHER PEOPLE: SOMEWHAT DIFFICULT
3. WORRYING TOO MUCH ABOUT DIFFERENT THINGS: MORE THAN HALF THE DAYS
7. FEELING AFRAID AS IF SOMETHING AWFUL MIGHT HAPPEN: SEVERAL DAYS
5. BEING SO RESTLESS THAT IT IS HARD TO SIT STILL: SEVERAL DAYS

## 2024-01-29 ASSESSMENT — PATIENT HEALTH QUESTIONNAIRE - PHQ9
SUM OF ALL RESPONSES TO PHQ QUESTIONS 1-9: 5
5. POOR APPETITE OR OVEREATING: NOT AT ALL
SUM OF ALL RESPONSES TO PHQ QUESTIONS 1-9: 5

## 2024-01-29 NOTE — PROGRESS NOTES
"  Assessment & Plan     Anxiety and depression  We reviewed that Wellbutrin is not indicated for anxiety.  He had some issues with decreased libido while on the Lexapro.    Start Cymbalta 30 mg daily.  We will have him use BuSpar right away to see if we can get him some immediate relief.  Follow-up in 2 weeks virtually  - DULoxetine (CYMBALTA) 30 MG capsule; Take 1 capsule (30 mg) by mouth daily  - busPIRone (BUSPAR) 10 MG tablet; Take 1 tablet (10 mg) by mouth 2 times daily    Elevated blood pressure reading without diagnosis of hypertension  Strong family history of hypertension.  Blood pressure looks okay today.  Some of his home readings have been elevated.  We will try to address his mood first.  Hopefully, getting his mood under better control will lead to lower blood pressures.    Patient Instructions   Take the Wellbutrin every other day for a few days and then stop.    Start Cymbalta (duloxetine) 30 mg capsule daily.    Start buspirone 10 mg tablet twice daily.    Follow-up virtual visit in 2 weeks      Alonso Beatty is a 38 year old, presenting for the following health issues:    Establish Care (Med check, blood pressure)      1/29/2024     1:19 PM   Additional Questions   Roomed by Jannette KENDRICK     History of Present Illness       Reason for visit:  Check up    He eats 2-3 servings of fruits and vegetables daily.He consumes 1 sweetened beverage(s) daily.He exercises with enough effort to increase his heart rate 60 or more minutes per day.  He exercises with enough effort to increase his heart rate 5 days per week.   He is taking medications regularly.     Here to discuss his mood and establish care        Objective    /82 (BP Location: Right arm, Patient Position: Sitting)   Pulse 62   Temp 97.9  F (36.6  C)   Resp 16   Ht 1.778 m (5' 10\")   Wt 88.5 kg (195 lb)   SpO2 96%   BMI 27.98 kg/m    Body mass index is 27.98 kg/m .  Physical Exam   Patient is healthy appearing and in no acute " distress        Signed Electronically by: Benjie Centeno, CNP

## 2024-01-29 NOTE — PATIENT INSTRUCTIONS
Take the Wellbutrin every other day for a few days and then stop.    Start Cymbalta (duloxetine) 30 mg capsule daily.    Start buspirone 10 mg tablet twice daily.    Follow-up virtual visit in 2 weeks

## 2024-02-12 ENCOUNTER — VIRTUAL VISIT (OUTPATIENT)
Dept: INTERNAL MEDICINE | Facility: CLINIC | Age: 39
End: 2024-02-12
Payer: COMMERCIAL

## 2024-02-12 DIAGNOSIS — F41.9 ANXIETY: Primary | ICD-10-CM

## 2024-02-12 PROCEDURE — 99213 OFFICE O/P EST LOW 20 MIN: CPT | Mod: 95 | Performed by: NURSE PRACTITIONER

## 2024-02-12 NOTE — PROGRESS NOTES
Rogerio is a 39 year old who is being evaluated via a billable video visit.      How would you like to obtain your AVS? Mail a copy  If the video visit is dropped, the invitation should be resent by: Text to cell phone: [phone]  Will anyone else be joining your video visit? No          Assessment & Plan     Anxiety  He is feeling better after stopping the Wellbutrin and starting the Cymbalta.  He is also seeing fairly good efficacy from the BuSpar.    We will follow-up 4 weeks from now.  At that time, we could discuss increasing the Cymbalta to 60 mg or staying the course on his current medication regimen.          Subjective   Rogerio is a 39 year old, presenting for the following health issues:  Follow Up (2 week)    Virtual visit to reassess the anxiety.  We had him stop Wellbutrin and start Cymbalta 2 weeks ago.  Says he is feeling about 10% better now and has already noticed a difference.  Does not want to make any changes to his medication regimen right now          Objective           Vitals:  No vitals were obtained today due to virtual visit.    Physical Exam   GENERAL: alert and no distress  EYES: Eyes grossly normal to inspection.  No discharge or erythema, or obvious scleral/conjunctival abnormalities.  RESP: No audible wheeze, cough, or visible cyanosis.    SKIN: Visible skin clear. No significant rash, abnormal pigmentation or lesions.  NEURO: Cranial nerves grossly intact.  Mentation and speech appropriate for age.  PSYCH: Appropriate affect, tone, and pace of words          Video-Visit Details    Type of service:  Video Visit     Originating Location (pt. Location): Home    Distant Location (provider location):  On-site  Platform used for Video Visit: Evan  Signed Electronically by: Benjie Centeno, KUSH

## 2024-04-29 DIAGNOSIS — F41.9 ANXIETY AND DEPRESSION: ICD-10-CM

## 2024-04-29 DIAGNOSIS — F32.A ANXIETY AND DEPRESSION: ICD-10-CM

## 2024-05-01 RX ORDER — DULOXETIN HYDROCHLORIDE 30 MG/1
30 CAPSULE, DELAYED RELEASE ORAL DAILY
Qty: 90 CAPSULE | Refills: 1 | Status: SHIPPED | OUTPATIENT
Start: 2024-05-01

## 2024-07-27 ENCOUNTER — HEALTH MAINTENANCE LETTER (OUTPATIENT)
Age: 39
End: 2024-07-27

## 2024-08-05 ENCOUNTER — TRANSFERRED RECORDS (OUTPATIENT)
Dept: HEALTH INFORMATION MANAGEMENT | Facility: CLINIC | Age: 39
End: 2024-08-05
Payer: COMMERCIAL

## 2024-10-30 DIAGNOSIS — F41.9 ANXIETY AND DEPRESSION: ICD-10-CM

## 2024-10-30 DIAGNOSIS — F32.A ANXIETY AND DEPRESSION: ICD-10-CM

## 2024-10-31 RX ORDER — DULOXETIN HYDROCHLORIDE 30 MG/1
30 CAPSULE, DELAYED RELEASE ORAL DAILY
Qty: 90 CAPSULE | Refills: 0 | Status: SHIPPED | OUTPATIENT
Start: 2024-10-31

## 2025-01-18 DIAGNOSIS — F41.9 ANXIETY AND DEPRESSION: ICD-10-CM

## 2025-01-18 DIAGNOSIS — F32.A ANXIETY AND DEPRESSION: ICD-10-CM

## 2025-01-20 RX ORDER — BUSPIRONE HYDROCHLORIDE 10 MG/1
10 TABLET ORAL 2 TIMES DAILY
Qty: 180 TABLET | Refills: 0 | Status: SHIPPED | OUTPATIENT
Start: 2025-01-20

## 2025-02-03 ENCOUNTER — OFFICE VISIT (OUTPATIENT)
Dept: URGENT CARE | Facility: URGENT CARE | Age: 40
End: 2025-02-03
Payer: COMMERCIAL

## 2025-02-03 VITALS
RESPIRATION RATE: 16 BRPM | BODY MASS INDEX: 25.97 KG/M2 | TEMPERATURE: 98.3 F | DIASTOLIC BLOOD PRESSURE: 90 MMHG | HEART RATE: 74 BPM | OXYGEN SATURATION: 98 % | WEIGHT: 181 LBS | SYSTOLIC BLOOD PRESSURE: 142 MMHG

## 2025-02-03 DIAGNOSIS — J11.1 INFLUENZA-LIKE ILLNESS: Primary | ICD-10-CM

## 2025-02-03 PROCEDURE — 99213 OFFICE O/P EST LOW 20 MIN: CPT | Performed by: PHYSICIAN ASSISTANT

## 2025-02-03 RX ORDER — BENZONATATE 100 MG/1
CAPSULE ORAL
Qty: 45 CAPSULE | Refills: 0 | Status: SHIPPED | OUTPATIENT
Start: 2025-02-03

## 2025-02-03 RX ORDER — GUAIFENESIN AND DEXTROMETHORPHAN HYDROBROMIDE 600; 30 MG/1; MG/1
1 TABLET, EXTENDED RELEASE ORAL EVERY 12 HOURS
COMMUNITY

## 2025-02-03 NOTE — PROGRESS NOTES
Chief Complaint   Patient presents with    Sinus Problem    Cough     3 days        Assessment & Plan  Assessment  Influenza like viral illness. The absence of chest pain and dyspnea, along with clear lung sounds, is less suggestive of pneumonia .    Plan  - Prescribe Tessalon pearls for cough management.  - Recommend Tylenol or ibuprofen for fever management.  - Advise returning for evaluation if symptoms worsen, including persistent chest pain or dyspnea.       ICD-10-CM    1. Influenza-like illness  J11.1 benzonatate (TESSALON) 100 MG capsule          SUBJECTIVE:  History of Present Illness-Rogerio Calvillo, a 39-year-old male, presents 2-3 days of symptoms. He reports nasal congestion, facial pain, and drainage causing a sore throat. Chest  congestion and coughing mainly. He denies experiencing chest pain or shortness of breath. He mentions having Mucinex at home for symptom relief.     ROS: Pertinent ROS neg other than the symptoms noted above in the HPI.     OBJECTIVE:  BP (!) 142/90   Pulse 74   Temp 98.3  F (36.8  C)   Resp 16   Wt 82.1 kg (181 lb)   SpO2 98%   BMI 25.97 kg/m     Physical Exam  - HEENT: Oropharynx without erythema. Mild nasal swelling observed.  - LUNGS: Lungs clear to auscultation bilaterally, breath sounds normal.   GENERAL: alert and no distress  CV: regular rate and rhythm, normal S1 S2, no S3 or S4, no murmur, click or rub, no peripheral edema     DIAGNOSTICS       No results found for any visits on 02/03/25.     Patel Lee PA-C    Consent was obtained from the patient to use an AI documentation tool in the creation of this note.  Any grammatical or spelling errors are non-intentional. Please contact the author of this note directly if you are in need of any clarification.     Current Outpatient Medications   Medication Sig Dispense Refill    busPIRone (BUSPAR) 10 MG tablet Take 1 tablet (10 mg) by mouth 2 times daily 180 tablet 0    dextromethorphan-guaiFENesin (MUCINEX  DM)  MG 12 hr tablet Take 1 tablet by mouth every 12 hours.      DULoxetine (CYMBALTA) 30 MG capsule Take 1 capsule (30 mg) by mouth daily 90 capsule 0    multivitamin therapeutic tablet [MULTIVITAMIN THERAPEUTIC TABLET] Take 1 tablet by mouth daily. (Patient not taking: Reported on 2/3/2025)      valACYclovir (VALTREX) 1000 mg tablet Take 1 tablet (1,000 mg) by mouth 3 times daily for 7 days 21 tablet 0     No current facility-administered medications for this visit.      Patient Active Problem List   Diagnosis    BYRON (generalized anxiety disorder)      Past Medical History:   Diagnosis Date    Anxiety      Past Surgical History:   Procedure Laterality Date    MIDDLE EAR SURGERY      OTHER SURGICAL HISTORY  2019    orif right ankle     Family History   Problem Relation Age of Onset    Diabetes Mother     Hypertension Mother     No Known Problems Father     Cancer Maternal Grandmother     Early Death Maternal Grandfather     Heart Disease Maternal Grandfather     No Known Problems Paternal Grandmother     Heart Disease Paternal Grandfather      Social History     Tobacco Use    Smoking status: Never     Passive exposure: Past    Smokeless tobacco: Never   Substance Use Topics    Alcohol use: Yes     Comment: Alcoholic Drinks/day: 1-2/month

## 2025-02-04 ENCOUNTER — TELEPHONE (OUTPATIENT)
Dept: INTERNAL MEDICINE | Facility: CLINIC | Age: 40
End: 2025-02-04
Payer: COMMERCIAL

## 2025-02-04 NOTE — TELEPHONE ENCOUNTER
Forms/Letter Request    Type of form/letter: Work    Have you been seen for this request: Yes 02/03/2025 [facility] - SEEN BY     Patel Lee PA-C       Do we have the form/letter: Yes: Needs doctors note for work. Missed work on 02/03/2025 - 02/10/2025    When is form/letter needed by: asap by 02/05/2025    How would you like the form/letter returned: Email to : [email or web address]    Patient Notified form requests are processed in 3-5 business days:Yes    Could we send this information to you in [place] or would you prefer to receive a phone call?:   Patient would like to be contacted via [place]

## 2025-02-04 NOTE — TELEPHONE ENCOUNTER
"I've written a letter to the patient's Mychart. I did not put in exact dates requested because the patient was vitally stable, without fever, and had clear lung sounds and he's asking for future time off of a full week. I wrote \"expected time off 1-5 days\" for his employer and that's the max I'm willing to write for now in case he writes or calls back. No action needed at this time. "

## 2025-02-04 NOTE — LETTER
February 5, 2025      Rogerio Calvillo  [address] [address]        To Whom It May Concern:    Rogerio Calvillo was seen in our clinic. Excuse him 02/03/2025 - 02/10/2025       Sincerely,  Patel Lee PA-C         Electronically signed

## 2025-05-27 DIAGNOSIS — F32.A ANXIETY AND DEPRESSION: ICD-10-CM

## 2025-05-27 DIAGNOSIS — F41.9 ANXIETY AND DEPRESSION: ICD-10-CM

## 2025-05-28 RX ORDER — DULOXETIN HYDROCHLORIDE 30 MG/1
30 CAPSULE, DELAYED RELEASE ORAL DAILY
Qty: 90 CAPSULE | Refills: 0 | Status: SHIPPED | OUTPATIENT
Start: 2025-05-28

## 2025-08-10 ENCOUNTER — HEALTH MAINTENANCE LETTER (OUTPATIENT)
Age: 40
End: 2025-08-10